# Patient Record
Sex: MALE | Race: WHITE | URBAN - METROPOLITAN AREA
[De-identification: names, ages, dates, MRNs, and addresses within clinical notes are randomized per-mention and may not be internally consistent; named-entity substitution may affect disease eponyms.]

---

## 2017-07-13 ENCOUNTER — APPOINTMENT (OUTPATIENT)
Dept: GENERAL RADIOLOGY | Facility: CLINIC | Age: 32
End: 2017-07-13
Attending: EMERGENCY MEDICINE
Payer: OTHER MISCELLANEOUS

## 2017-07-13 ENCOUNTER — HOSPITAL ENCOUNTER (EMERGENCY)
Facility: CLINIC | Age: 32
Discharge: HOME | End: 2017-07-13
Attending: EMERGENCY MEDICINE | Admitting: EMERGENCY MEDICINE
Payer: OTHER MISCELLANEOUS

## 2017-07-13 VITALS
TEMPERATURE: 97.2 F | OXYGEN SATURATION: 97 % | DIASTOLIC BLOOD PRESSURE: 80 MMHG | WEIGHT: 140 LBS | HEART RATE: 70 BPM | BODY MASS INDEX: 19.6 KG/M2 | RESPIRATION RATE: 16 BRPM | HEIGHT: 71 IN | SYSTOLIC BLOOD PRESSURE: 115 MMHG

## 2017-07-13 DIAGNOSIS — M54.6 ACUTE BILATERAL THORACIC BACK PAIN: ICD-10-CM

## 2017-07-13 LAB — INTERPRETATION ECG - MUSE: NORMAL

## 2017-07-13 PROCEDURE — 93005 ELECTROCARDIOGRAM TRACING: CPT

## 2017-07-13 PROCEDURE — 71020 XR CHEST 2 VW: CPT

## 2017-07-13 PROCEDURE — 99285 EMERGENCY DEPT VISIT HI MDM: CPT | Mod: 25

## 2017-07-13 PROCEDURE — 72072 X-RAY EXAM THORAC SPINE 3VWS: CPT

## 2017-07-13 ASSESSMENT — ENCOUNTER SYMPTOMS
BACK PAIN: 1
NUMBNESS: 0

## 2017-07-13 NOTE — ED NOTES
Pt provided with discharge paperwork and educated on recommended follow-up. Pt educated on how to manage pain at home. Pt voiced understanding and denied any questions at discharge.

## 2017-07-13 NOTE — ED AVS SNAPSHOT
[facility]    [address]    [facility] [address]    Phone:  [phone]    Fax:  [phone]                                       Ulices Otoole   MRN: [id number]    Department:  [facility]   Date of Visit:  7/13/2017           After Visit Summary Signature Page     I have received my discharge instructions, and my questions have been answered. I have discussed any challenges I see with this plan with the nurse or doctor.    ..........................................................................................................................................  Patient/Patient Representative Signature      ..........................................................................................................................................  Patient Representative Print Name and Relationship to Patient    ..................................................               ................................................  Date                                            Time    ..........................................................................................................................................  Reviewed by Signature/Title    ...................................................              ..............................................  Date                                                            Time

## 2017-07-13 NOTE — ED PROVIDER NOTES
"  History     Chief Complaint:  Back Pain      HPI   Ulices Otoole is a 31 year old male who presents to the emergency department today for evaluation of back pain. The patient reports that he was lifting a cabinet when he heard a pop. He now has pain between his shoulder blades that radiates forward to his sternum. He has not taken any medications for the pain. He has pain when trying to lift both of his arms. The patient denies any numbness or tingling in his arms or legs. The cabinet was not heavy and he was using leverage to lift it. Of note, the patient had issues with his lower back two years prior.    Allergies:  Penicillins      Medications:    The patient is currently on no regular medications.     Past Medical History:    History reviewed. No pertinent past medical history.     Past Surgical History:    History reviewed. No pertinent past surgical history.    Family History:    History reviewed. No pertinent family history.     Social History:  The patient was alone.  Smoking Status: Former Smoker  Smokeless Tobacco: Not on file  Alcohol Use: Yes     Review of Systems   Cardiovascular: Positive for chest pain.   Musculoskeletal: Positive for back pain.   Neurological: Negative for numbness.   All other systems reviewed and are negative.    Physical Exam   First Vitals:  BP: (!) 119/94  Pulse: 70  Heart Rate: 70  Temp: 97.2  F (36.2  C)  Resp: 16  Height: 180.3 cm (5' 11\")  Weight: 63.5 kg (140 lb)  SpO2: 100 %    Physical Exam  General: Well-nourished, sitting upright, appears to be in pain  Eyes: PERRL, conjunctivae pink no scleral icterus or conjunctival injection  ENT:  Moist mucus membranes, posterior oropharynx clear without erythema or exudates  Respiratory:  Lungs clear to auscultation bilaterally, no crackles/rubs/wheezes.  Good air movement  CV: Normal rate and rhythm, no murmurs/rubs/gallops. Symmetric radial pulses bilaterally  GI:  Abdomen soft and non-distended.  Normoactive BS.  No " tenderness, guarding or rebound  Skin: Warm, dry.  No rashes or petechiae  Musculoskeletal: No peripheral edema or calf tenderness.  No apparent tenderness over entire spine.  Pain with movement.  Neuro: Alert and oriented to person/place/time.  PERRL, EOMI no nystagmus, no aphasia/facial droop/dysarthria, tongue midline, symmetric palatal elevation, normal strength at SCM/trapezius/BUE/BLE, normal coordination to FNF at BUE, gait deferred, negative romberg, sensation intact to LT over face/BUE/BLE. Normal saddle and deltoid sensation.  Normal and symmetric reflexes at brachioradialis/patella tendon bilaterally.  Normal and symmetric strength at BLE.  Psychiatric: Normal affect    Emergency Department Course     ECG:  Indication: Back Pain  Completed at 0952.  Read at 0957.   Normal sinus rhythm  Possible left atrial enlargement  Incomplete right bundle branch block  Borderline ECG  Rate 78 bpm. AR interval 148. QRS duration 100. QT/QTc 364/414. P-R-T axes 84 67 76.     Imaging:  Radiology findings were communicated with the patient who voiced understanding of the findings.    Thoracic spine XR, 3 views  IMPRESSION: No fracture or malalignment.  Report per radiology     Chest XR, PA&LAT  IMPRESSION:  No acute abnormality.  Report per radiology      Emergency Department Course:  Nursing notes and vitals reviewed.  I performed an exam of the patient as documented above.   EKG obtained in the ED, see results above.    The patient was sent for a Thoracic spine XR, 3 views and Chest XR, PA&LAT while in the emergency department, results above.    1125: Patient rechecked and updated.  The patient declined any medications to go home with and follow up with physical. He will follow up with his chiropractor instead.  I discussed the treatment plan with the patient. They expressed understanding of this plan and consented to discharge. They will be discharged home with instructions for care and follow up. In addition, the  patient will return to the emergency department if their symptoms persist, worsen, if new symptoms arise or if there is any concern.  All questions were answered.   I personally reviewed the imaging results with the Patient and answered all related questions prior to discharge.    Impression & Plan      Medical Decision Making:  Ulices Otoole is a 31 year old male who was lifting a cabinet and felt a sudden onset pop sensation accompanied by mid thoracic pain and discomfort. He is not tender. It is worse with movement but somewhat atypical in the sudden onset and feeling of radiation to this chest area. The patient has a non ischemic EKG. I did do a chest x-ray to make sure that there is no pneumothorax or widening of the aorta and this negative. He has equal pulses. I think it is very unlikely that this is an aortic dissection. He has no other accompanying symptoms and so I feel the risk of CT imaging outweighs any benefits. I feel it is most likely musculoskeletal in nature. Given the severe popping sensation an x-ray was obtained of the thoracic spine. This was normal. At this point he is feeling better. He doesn't have any numbness or weakness. There is no indication for MRI or CT imaging given no significant trauma. He declines any pain medication. He wishes to follow up with his chiropractor. He can take ibuprofen for pain. He will discuss physical therapy with chiropractor as well. I did discuss with him precautions to return if he has any neurologic symptoms, bowel or bladder issues or becomes worse in any way.    Diagnosis:    ICD-10-CM    1. Acute bilateral thoracic back pain M54.6      Disposition:   Discharged to home      Scribe Disclosure:  SCOTT, Hazel Mcconnell, am serving as a scribe at 9:28 AM on 7/13/2017 to document services personally performed by May Perez MD, based on my observations and the provider's statements to me.    7/13/2017   [facility] EMERGENCY DEPARTMENT       Cho,  May POLLOCK MD  07/13/17 [id number]

## 2017-07-13 NOTE — DISCHARGE INSTRUCTIONS
*You may resume diet and activities.  *Take medications as prescribed.  Ibuprofen for pain. Continue your current medications.  *Follow-up with your doctor in the next 2-3 days for reevaluation, possible referral to physical therapy and ongoing medication.  *Return as soon as possible if you develop numbness, weakness, bowel or bladder incontinence or become worse in any way.      Thoracic Spine Strain  The thoracic spine is the middle part of the back between the neck and the lower back. A thoracic spine strain is due to stretching and tearing of the muscle fibers that support the spine. This may happen because of severe coughing or heavy lifting. Or it may be caused by twisting injuries of the upper back, such as from a fall or a car or bike accident.       This often causes increased pain when you move or breathe deeply. This may take 3 to 6 weeks to heal.  Home care    Rest. Avoid heavy lifting or hard work. Avoid any activity that causes pain.    You may find relief with heat (hot shower, hot bath or heating pad) and massage. Or you may prefer cold packs. Try both and use the method that feels best for 20 minutes several times a day. To make an ice pack, put ice cubes in a plastic bag that seals at the top. Wrap the bag in a clean, thin towel or cloth. Never put ice or an ice pack directly on your skin.    If you have a severe cough, use an over-the-counter cough medicine unless another cough medicine was prescribed.    You may use over-the-counter pain medicine to control pain, unless another medicine was prescribed. Talk with your provider before using these medicines if you have chronic liver or kidney disease or ever had a stomach ulcer or GI bleeding.  Follow-up care  Follow up with your healthcare provider, or as directed.  When to seek medical advice  Call your healthcare provider right away if you have:    A change in the type of pain: if it feels different, becomes more severe, lasts longer, or begins  to spread into your shoulder, arm, neck, jaw or back  Call 911  Call 911 if you have:    Shortness of breath or increased pain with breathing    Cough with dark colored sputum (phlegm) or blood    Weakness, dizziness, or fainting    Numbness or weakness in one or both legs or arms  Date Last Reviewed: 11/19/2015 2000-2017 [employer]. [address] [place] [address]. All rights reserved. This information is not intended as a substitute for professional medical care. Always follow your healthcare professional's instructions.      on prescriptions.  *Return if you develop numbness, weakness, bowel or bladder incontinence, faint or feel like you will faint or become worse in any way.      Discharge Instructions  Back Pain  You were seen today for back pain. Back pain can have many causes, but most will get better without surgery or other specific treatment. Sometimes there is a herniated ( slipped ) disc. We don t usually do MRI scans to look for these right away, since most herniated discs will get better on their own with time.  Today, we did not find any evidence that your back pain was caused by a serious condition, such as an infection, fracture, or tumor. However, sometimes symptoms develop over time and cannot be found during an emergency visit, so it is very important that you follow up with your primary doctor.  Return to the Emergency Department if:    You develop a fever with your back pain.     You have weakness or change in sensation in one or both legs.    You lose control of your bowels or bladder, or can t empty your bladder.    Your pain gets much worse.     Follow-up with your doctor:    Unless your pain has completely gone away, please make an appointment with your doctor within one week.  You may need further management of your back pain, such as more pain medication, imaging such as an X-ray or MRI, or physical therapy.    What can I do to help myself?    Remain active --  People are often afraid that they will hurt their back further or delay recovery by remaining active, but this is one of the best things you can do for your back. In fact, prolonged bed rest is not recommended. Studies have shown that people with low back pain recover faster when they remain active. Movement helps to bring blood flow to the muscles and relieve muscle spasms as well as preventing loss of muscle strength.    Heat -- Using a heating pad can help with low back pain during the first few weeks. Do not sleep with a heating pad, as you can be burned.     Pain medications -- Take a pain medication such as, acetaminophen (Tylenol ), ibuprofen (Advil , Nuprin  ) or naproxen (Aleve ).  If you have been given a narcotic (such as codeine, hydrocodone, or oxycodone) or a muscle relaxant (such as Flexeril   or Soma  ), do not drive for four hours after you have taken it. If the narcotic contains acetaminophen (Tylenol), do not take Tylenol with it. All narcotics will cause constipation, so eat a high fiber diet.          Remember that you can always come back to the Emergency Department if you are not able to see your regular doctor in the amount of time listed above, if you get any new symptoms, or if there is anything that worries you.    Opioid Medication Information    You have been given a prescription for an opioid (narcotic) pain medicine and/or have received a pain medicine while here in the Emergency Department. These medicines can make you drowsy or impaired. You must not drive, operate dangerous equipment, or engage in any other dangerous activities while taking these medications. If you drive while taking these medications, you could be arrested for DUI, or driving under the influence. Do not drink any alcohol while you are taking these medications.   Opioid pain medications can cause addiction. If you have a history of chemical dependency of any type, you are at a higher risk of becoming addicted to  pain medications.  Only take these prescribed medications to treat your pain when all other options have been tried. Take it for as short a time and as few doses as possible. Store your pain pills in a secure place, as they are frequently stolen and provide a dangerous opportunity for children or visitors in your house to start abusing these powerful medications. We will not replace any lost or stolen medicine.  As soon as your pain is better, you should flush all your remaining medication.   Many prescription pain medications contain Tylenol  (acetaminophen), including Vicodin , Tylenol #3 , Norco , Lortab , and Percocet .  You should not take any extra pills of Tylenol  if you are using these prescription medications or you can get very sick.  Do not ever take more than 4000 mg of acetaminophen in any 24 hour period.  All opioids tend to cause constipation. Drink plenty of water and eat foods that have a lot of fiber, such as fruits, vegetables, prune juice, apple juice and high fiber cereal.  Take a laxative if you don t move your bowels at least every other day. Miralax , Milk of Magnesia, Colace , or Senna  can be used to keep you regular.

## 2017-07-13 NOTE — ED AVS SNAPSHOT
[facility] Emergency Department    [address] [place] [address]    Phone:  [phone]    Fax:  [phone]                                       Ulices Otoole   MRN: [id number]    Department:  [facility] Emergency Department   Date of Visit:  7/13/2017           Patient Information     Date Of Birth          1985        Your diagnoses for this visit were:     Acute bilateral thoracic back pain        You were seen by May Perez MD.      Follow-up Information     Follow up with Your doctor. Schedule an appointment as soon as possible for a visit in 3 days.        Discharge Instructions       *You may resume diet and activities.  *Take medications as prescribed.  Ibuprofen for pain. Continue your current medications.  *Follow-up with your doctor in the next 2-3 days for reevaluation, possible referral to physical therapy and ongoing medication.  *Return as soon as possible if you develop numbness, weakness, bowel or bladder incontinence or become worse in any way.      Thoracic Spine Strain  The thoracic spine is the middle part of the back between the neck and the lower back. A thoracic spine strain is due to stretching and tearing of the muscle fibers that support the spine. This may happen because of severe coughing or heavy lifting. Or it may be caused by twisting injuries of the upper back, such as from a fall or a car or bike accident.       This often causes increased pain when you move or breathe deeply. This may take 3 to 6 weeks to heal.  Home care    Rest. Avoid heavy lifting or hard work. Avoid any activity that causes pain.    You may find relief with heat (hot shower, hot bath or heating pad) and massage. Or you may prefer cold packs. Try both and use the method that feels best for 20 minutes several times a day. To make an ice pack, put ice cubes in a plastic bag that seals at the top. Wrap the bag in a clean, thin towel or cloth. Never put  ice or an ice pack directly on your skin.    If you have a severe cough, use an over-the-counter cough medicine unless another cough medicine was prescribed.    You may use over-the-counter pain medicine to control pain, unless another medicine was prescribed. Talk with your provider before using these medicines if you have chronic liver or kidney disease or ever had a stomach ulcer or GI bleeding.  Follow-up care  Follow up with your healthcare provider, or as directed.  When to seek medical advice  Call your healthcare provider right away if you have:    A change in the type of pain: if it feels different, becomes more severe, lasts longer, or begins to spread into your shoulder, arm, neck, jaw or back  Call 911  Call 911 if you have:    Shortness of breath or increased pain with breathing    Cough with dark colored sputum (phlegm) or blood    Weakness, dizziness, or fainting    Numbness or weakness in one or both legs or arms  Date Last Reviewed: 11/19/2015 2000-2017 [employer]. [address] [place] [address]. All rights reserved. This information is not intended as a substitute for professional medical care. Always follow your healthcare professional's instructions.      on prescriptions.  *Return if you develop numbness, weakness, bowel or bladder incontinence, faint or feel like you will faint or become worse in any way.      Discharge Instructions  Back Pain  You were seen today for back pain. Back pain can have many causes, but most will get better without surgery or other specific treatment. Sometimes there is a herniated ( slipped ) disc. We don t usually do MRI scans to look for these right away, since most herniated discs will get better on their own with time.  Today, we did not find any evidence that your back pain was caused by a serious condition, such as an infection, fracture, or tumor. However, sometimes symptoms develop over time and cannot be found during an  emergency visit, so it is very important that you follow up with your primary doctor.  Return to the Emergency Department if:    You develop a fever with your back pain.     You have weakness or change in sensation in one or both legs.    You lose control of your bowels or bladder, or can t empty your bladder.    Your pain gets much worse.     Follow-up with your doctor:    Unless your pain has completely gone away, please make an appointment with your doctor within one week.  You may need further management of your back pain, such as more pain medication, imaging such as an X-ray or MRI, or physical therapy.    What can I do to help myself?    Remain active -- People are often afraid that they will hurt their back further or delay recovery by remaining active, but this is one of the best things you can do for your back. In fact, prolonged bed rest is not recommended. Studies have shown that people with low back pain recover faster when they remain active. Movement helps to bring blood flow to the muscles and relieve muscle spasms as well as preventing loss of muscle strength.    Heat -- Using a heating pad can help with low back pain during the first few weeks. Do not sleep with a heating pad, as you can be burned.     Pain medications -- Take a pain medication such as, acetaminophen (Tylenol ), ibuprofen (Advil , Nuprin  ) or naproxen (Aleve ).  If you have been given a narcotic (such as codeine, hydrocodone, or oxycodone) or a muscle relaxant (such as Flexeril   or Soma  ), do not drive for four hours after you have taken it. If the narcotic contains acetaminophen (Tylenol), do not take Tylenol with it. All narcotics will cause constipation, so eat a high fiber diet.          Remember that you can always come back to the Emergency Department if you are not able to see your regular doctor in the amount of time listed above, if you get any new symptoms, or if there is anything that worries you.    Opioid Medication  Information    You have been given a prescription for an opioid (narcotic) pain medicine and/or have received a pain medicine while here in the Emergency Department. These medicines can make you drowsy or impaired. You must not drive, operate dangerous equipment, or engage in any other dangerous activities while taking these medications. If you drive while taking these medications, you could be arrested for DUI, or driving under the influence. Do not drink any alcohol while you are taking these medications.   Opioid pain medications can cause addiction. If you have a history of chemical dependency of any type, you are at a higher risk of becoming addicted to pain medications.  Only take these prescribed medications to treat your pain when all other options have been tried. Take it for as short a time and as few doses as possible. Store your pain pills in a secure place, as they are frequently stolen and provide a dangerous opportunity for children or visitors in your house to start abusing these powerful medications. We will not replace any lost or stolen medicine.  As soon as your pain is better, you should flush all your remaining medication.   Many prescription pain medications contain Tylenol  (acetaminophen), including Vicodin , Tylenol #3 , Norco , Lortab , and Percocet .  You should not take any extra pills of Tylenol  if you are using these prescription medications or you can get very sick.  Do not ever take more than 4000 mg of acetaminophen in any 24 hour period.  All opioids tend to cause constipation. Drink plenty of water and eat foods that have a lot of fiber, such as fruits, vegetables, prune juice, apple juice and high fiber cereal.  Take a laxative if you don t move your bowels at least every other day. Miralax , Milk of Magnesia, Colace , or Senna  can be used to keep you regular.                24 Hour Appointment Hotline       To make an appointment at any [facility], call [phone]  ([phone]). If you don't have a family doctor or clinic, we will help you find one. [facility] are conveniently located to serve the needs of you and your family.             Review of your medicines      Notice     You have not been prescribed any medications.            Procedures and tests performed during your visit     Chest XR,  PA & LAT    EKG 12-lead, tracing only    Thoracic spine XR, 3 views      Orders Needing Specimen Collection     None      Pending Results     Date and Time Order Name Status Description    7/13/2017 0943 Thoracic spine XR, 3 views Preliminary     7/13/2017 0943 Chest XR,  PA & LAT Preliminary     7/13/2017 0943 EKG 12-lead, tracing only Preliminary             Pending Culture Results     No orders found from 7/11/2017 to 7/14/2017.            Pending Results Instructions     If you had any lab results that were not finalized at the time of your Discharge, you can call the ED Lab Result RN at [phone]. You will be contacted by this team for any positive Lab results or changes in treatment. The nurses are available 7 days a week from 10A to 6:30P.  You can leave a message 24 hours per day and they will return your call.        Test Results From Your Hospital Stay        7/13/2017 10:26 AM      Narrative     CHEST TWO VIEWS  7/13/2017 10:14 AM      HISTORY: Chest pain, back pain.    COMPARISON: None.    FINDINGS: The heart size is normal. The lungs are clear. No  pneumothorax or pleural effusion.         Impression     IMPRESSION:  No acute abnormality.         7/13/2017 10:26 AM      Narrative     THORACIC SPINE THREE VIEWS July 13, 2017 10:14 AM      HISTORY: Midscapular back pain.     COMPARISON: None.        Impression     IMPRESSION: No fracture or malalignment.                Clinical Quality Measure: Blood Pressure Screening     Your blood pressure was checked while you were in the emergency department today. The last reading we obtained was  BP: 121/81 . Please  "read the guidelines below about what these numbers mean and what you should do about them.  If your systolic blood pressure (the top number) is less than 120 and your diastolic blood pressure (the bottom number) is less than 80, then your blood pressure is normal. There is nothing more that you need to do about it.  If your systolic blood pressure (the top number) is 120-139 or your diastolic blood pressure (the bottom number) is 80-89, your blood pressure may be higher than it should be. You should have your blood pressure rechecked within a year by a primary care provider.  If your systolic blood pressure (the top number) is 140 or greater or your diastolic blood pressure (the bottom number) is 90 or greater, you may have high blood pressure. High blood pressure is treatable, but if left untreated over time it can put you at risk for heart attack, stroke, or kidney failure. You should have your blood pressure rechecked by a primary care provider within the next 4 weeks.  If your provider in the emergency department today gave you specific instructions to follow-up with your doctor or provider even sooner than that, you should follow that instruction and not wait for up to 4 weeks for your follow-up visit.        Thank you for choosing [facility]       Thank you for choosing [facility] for your care. Our goal is always to provide you with excellent care. Hearing back from our patients is one way we can continue to improve our services. Please take a few minutes to complete the written survey that you may receive in the mail after you visit with us. Thank you!        [employer] Information     [employer] lets you send messages to your doctor, view your test results, renew your prescriptions, schedule appointments and more. To sign up, go to [email or web address] . Click on \"Log in\" on the left side of the screen, which will take you to the Welcome page. Then click on \"Sign up Now\" on the right side of the page.     You " will be asked to enter the access code listed below, as well as some personal information. Please follow the directions to create your username and password.     Your access code is: [email or web address]  Expires: 10/11/2017 11:41 AM     Your access code will  in 90 days. If you need help or a new code, please call your [facility] clinic or [phone].        Care EveryWhere ID     This is your Care EveryWhere ID. This could be used by other organizations to access your [facility] medical records  [id number]        Equal Access to Services     [facility]: Khushbu Bustamante, ray martinez, baldomero gonzalez, lawrence pedersen . So [facility] [phone].    ATENCIÓN: Si habla español, tiene a gupta disposición servicios gratuitos de asistencia lingüística. [place] al [phone].    We comply with applicable federal civil rights laws and [place] laws. We do not discriminate on the basis of race, color, national origin, age, disability sex, sexual orientation or gender identity.            After Visit Summary       This is your record. Keep this with you and show to your community pharmacist(s) and doctor(s) at your next visit.

## 2017-07-13 NOTE — ED NOTES
Pt arrives to the ED for back pain between shoulder blades. Pt was lifting a cabinet 20 min ago when he heard and felt a popping sensation in back. Denies any numbness/tingling in arms or legs. Pain 7/10. Has not taken anything for pain